# Patient Record
Sex: FEMALE | Race: WHITE | Employment: OTHER | URBAN - METROPOLITAN AREA
[De-identification: names, ages, dates, MRNs, and addresses within clinical notes are randomized per-mention and may not be internally consistent; named-entity substitution may affect disease eponyms.]

---

## 2019-05-21 ENCOUNTER — OFFICE VISIT (OUTPATIENT)
Dept: URGENT CARE | Facility: RETAIL CLINIC | Age: 64
End: 2019-05-21
Payer: COMMERCIAL

## 2019-05-21 VITALS
DIASTOLIC BLOOD PRESSURE: 85 MMHG | OXYGEN SATURATION: 96 % | SYSTOLIC BLOOD PRESSURE: 175 MMHG | HEART RATE: 69 BPM | TEMPERATURE: 98.3 F

## 2019-05-21 DIAGNOSIS — H10.31 ACUTE CONJUNCTIVITIS OF RIGHT EYE, UNSPECIFIED ACUTE CONJUNCTIVITIS TYPE: Primary | ICD-10-CM

## 2019-05-21 PROCEDURE — 99213 OFFICE O/P EST LOW 20 MIN: CPT | Performed by: INTERNAL MEDICINE

## 2019-05-21 RX ORDER — CIPROFLOXACIN HYDROCHLORIDE 3.5 MG/ML
1-2 SOLUTION/ DROPS TOPICAL 4 TIMES DAILY
Qty: 2.8 ML | Refills: 0 | Status: SHIPPED | OUTPATIENT
Start: 2019-05-21 | End: 2019-05-28

## 2019-05-21 NOTE — PROGRESS NOTES
[facility] Care Progress Note        Jonny Lee MD, MPH  05/21/2019        History:      Viviana Esparza is a pleasant 64 year old year old female with Irritation and redness of the right eye, With yellowish Drainage for 2  Day (s). No change in visual accuity. Patient does not wear contact lenses. No fever or illness. No cough or shortness of breath.  No chest pain. No headache or neck pain. No trauma to the eye. No photophobia. No nasal drainage.          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  F/u w PCP in 2 days, earlier if symptoms worsen.  Elevated BP: Viviana to follow up with Primary Care provider regarding elevated blood pressure.                   Physical Exam:      /85 (BP Location: Left arm)   Pulse 69   Temp 98.3  F (36.8  C) (Tympanic)   LMP 12/06/2009   SpO2 96%      Constitutional: Patient is in no distress The patient is pleasant and cooperative.   HEENT: Head:  Head is atraumatic, normocephalic.    Eyes: Pupils are equal, round and reactive to light and accomodation.  Sclera is non-icteric.  Right conjunctival injection, erythema and exudate noted. Extraocular motion is intact. Visual acuity is intact bilaterally.  Ears:  External acoustic canals are patent and clear.  There is no erythema and bulging( exudate)  of the ( R/L ) tympanic membrane(s ).   Nose:  No Nasal congestion w/o drainage or mucosal ulceration is noted.  Throat:  Oral mucosa is moist.  No oral lesions are noted.  No posterior pharyngeal hyperemia nor exudate noted.     Neck Supple.  There is no cervical lymphadenopathy.  No nuchal rigidity noted.  There is no meningismus.     Cardiovascular: Heart is regular to rate and  rhythm.  No murmur is noted. Recheck /84    Lungs: Clear in the anterior and posterior pulmonary fields.   Abdomen: Soft and non-tender.    Back No flank tenderness is noted.   Extremeties No edema, no calf tenderness.   Neuro: No focal deficit.   Skin No petechiae or purpura is noted.  There is no rash.   Mood Normal              Data:      All new lab and imaging data was reviewed.   Results for orders placed or performed in visit on 03/24/15   HCL U/A, W/O MICRO, NON AUTO   Result Value Ref Range    Source Mid Stream     Color Urine Red     Appearance Urine Cloudy     Glucose neg Neg mg/dL    Bilirubin Urine  Neg    Ketones neg neg - neg mg/dL    Specific Gravity Urine 1.010 1.003 - 1.035    Blood Urine Large Neg    pH Arterial 7.5 (A) 5.0 - 7.0    Protein Urine 100 neg - neg mg/dL    Urobilinogen Urine  0.2 - 1.0 EU/dL    Nitrites pos Neg    Leukocyte Esterase Urine Large Neg   Urine Culture Aerobic Bacterial   Result Value Ref Range    Specimen Description Midstream Urine     Culture Micro >100,000 colonies/mL Escherichia coli (A)     Micro Report Status FINAL 03/26/2015     Organism: >100,000 colonies/mL Escherichia coli        Susceptibility    >100,000 colonies/ml escherichia coli (shirley) -  (no method available)     AMPICILLIN <=2 Susceptible  ug/mL     CEFAZOLIN <=4 Susceptible  ug/mL     CEFOXITIN <=4 Susceptible  ug/mL     CEFTAZIDIME <=1 Susceptible  ug/mL     CEFTRIAXONE <=1 Susceptible  ug/mL     CIPROFLOXACIN <=0.25 Susceptible  ug/mL     GENTAMICIN <=1 Susceptible  ug/mL     LEVOFLOXACIN <=0.12 Susceptible  ug/mL     NITROFURANTOIN 32 Susceptible  ug/mL     TOBRAMYCIN <=1 Susceptible  ug/mL     Trimethoprim/Sulfa <=1/19 Susceptible  ug/mL     AMPICILLIN/SULBACTAM <=2 Susceptible  ug/mL     Piperacillin/Tazo <=4 Susceptible  ug/mL     CEFEPIME <=1 Susceptible  ug/mL